# Patient Record
Sex: FEMALE | Race: WHITE | Employment: FULL TIME | URBAN - METROPOLITAN AREA
[De-identification: names, ages, dates, MRNs, and addresses within clinical notes are randomized per-mention and may not be internally consistent; named-entity substitution may affect disease eponyms.]

---

## 2018-01-11 ENCOUNTER — OFFICE VISIT (OUTPATIENT)
Dept: ORTHOPEDIC SURGERY | Age: 43
End: 2018-01-11

## 2018-01-11 VITALS
WEIGHT: 144 LBS | BODY MASS INDEX: 26.5 KG/M2 | HEIGHT: 62 IN | HEART RATE: 78 BPM | SYSTOLIC BLOOD PRESSURE: 124 MMHG | DIASTOLIC BLOOD PRESSURE: 94 MMHG

## 2018-01-11 DIAGNOSIS — M25.511 RIGHT SHOULDER PAIN, UNSPECIFIED CHRONICITY: ICD-10-CM

## 2018-01-11 DIAGNOSIS — S43.51XA SPRAIN OF ACROMIOCLAVICULAR JOINT, RIGHT, INITIAL ENCOUNTER: Primary | ICD-10-CM

## 2018-01-11 PROCEDURE — 99203 OFFICE O/P NEW LOW 30 MIN: CPT | Performed by: ORTHOPAEDIC SURGERY

## 2018-01-11 RX ORDER — ESZOPICLONE 3 MG/1
3 TABLET, FILM COATED ORAL NIGHTLY
COMMUNITY
End: 2020-07-30

## 2018-01-11 RX ORDER — FLUOXETINE 10 MG/1
10 TABLET, FILM COATED ORAL DAILY
COMMUNITY
End: 2020-07-30

## 2018-01-11 RX ORDER — SULFASALAZINE 500 MG/1
500 TABLET ORAL 4 TIMES DAILY
COMMUNITY
End: 2020-07-30

## 2018-01-11 RX ORDER — TIZANIDINE 2 MG/1
2 TABLET ORAL EVERY 6 HOURS PRN
COMMUNITY
End: 2020-07-30
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
## 2020-07-30 ENCOUNTER — OFFICE VISIT (OUTPATIENT)
Dept: ORTHOPEDIC SURGERY | Age: 45
End: 2020-07-30
Payer: COMMERCIAL

## 2020-07-30 VITALS — WEIGHT: 144 LBS | HEIGHT: 62 IN | BODY MASS INDEX: 26.5 KG/M2

## 2020-07-30 PROBLEM — Z79.899 CONTROLLED SUBSTANCE AGREEMENT SIGNED: Status: ACTIVE | Noted: 2020-06-25

## 2020-07-30 PROBLEM — R91.1 NODULE OF LEFT LUNG: Status: ACTIVE | Noted: 2018-12-28

## 2020-07-30 PROCEDURE — 99203 OFFICE O/P NEW LOW 30 MIN: CPT | Performed by: ORTHOPAEDIC SURGERY

## 2020-07-30 RX ORDER — ATORVASTATIN CALCIUM 20 MG/1
TABLET, FILM COATED ORAL
COMMUNITY
Start: 2020-06-28

## 2020-07-30 RX ORDER — FLUOXETINE 10 MG/1
CAPSULE ORAL
COMMUNITY
Start: 2020-06-25 | End: 2020-08-13

## 2020-07-30 RX ORDER — ERGOCALCIFEROL 1.25 MG/1
CAPSULE ORAL
COMMUNITY
Start: 2020-06-28

## 2020-07-30 RX ORDER — LANSOPRAZOLE 30 MG/1
CAPSULE, DELAYED RELEASE ORAL
COMMUNITY
Start: 2020-06-11 | End: 2020-07-30

## 2020-07-30 RX ORDER — DICLOFENAC SODIUM 75 MG/1
TABLET, DELAYED RELEASE ORAL
COMMUNITY
Start: 2020-05-05 | End: 2020-07-30

## 2020-07-30 RX ORDER — ESZOPICLONE 2 MG/1
TABLET, FILM COATED ORAL
COMMUNITY
Start: 2020-07-17

## 2020-07-30 ASSESSMENT — ENCOUNTER SYMPTOMS
SHORTNESS OF BREATH: 1
BACK PAIN: 1

## 2020-07-30 NOTE — PROGRESS NOTES
Review of Systems   Constitutional: Positive for unexpected weight change. Eyes:        Eye impairment   Respiratory: Positive for shortness of breath. Asthma   Musculoskeletal: Positive for back pain and neck pain. Joint pain - shoulder   Neurological: Positive for headaches. Chronic pain  fibromyalgia   Psychiatric/Behavioral:        Depression  Anxiety    All other systems reviewed and are negative.

## 2020-07-30 NOTE — PROGRESS NOTES
Diagnosis Date    Arthritis     Asthma     Blood clot in vein     PE (pulmonary thromboembolism) (HCC)     Rheumatoid arteritis (HCC)      Past Surgical History:   Procedure Laterality Date    APPENDECTOMY      CHOLECYSTECTOMY      SHOULDER SURGERY       Current Outpatient Medications on File Prior to Visit   Medication Sig Dispense Refill    eszopiclone (LUNESTA) 2 MG TABS       FLUoxetine (PROZAC) 10 MG capsule       vitamin D (ERGOCALCIFEROL) 1.25 MG (66196 UT) CAPS capsule       atorvastatin (LIPITOR) 20 MG tablet        No current facility-administered medications on file prior to visit. Social History     Socioeconomic History    Marital status:      Spouse name: Not on file    Number of children: Not on file    Years of education: Not on file    Highest education level: Not on file   Occupational History    Not on file   Social Needs    Financial resource strain: Not on file    Food insecurity     Worry: Not on file     Inability: Not on file    Transportation needs     Medical: Not on file     Non-medical: Not on file   Tobacco Use    Smoking status: Never Smoker    Smokeless tobacco: Never Used   Substance and Sexual Activity    Alcohol use:  Yes    Drug use: No    Sexual activity: Not on file   Lifestyle    Physical activity     Days per week: Not on file     Minutes per session: Not on file    Stress: Not on file   Relationships    Social connections     Talks on phone: Not on file     Gets together: Not on file     Attends Restorationism service: Not on file     Active member of club or organization: Not on file     Attends meetings of clubs or organizations: Not on file     Relationship status: Not on file    Intimate partner violence     Fear of current or ex partner: Not on file     Emotionally abused: Not on file     Physically abused: Not on file     Forced sexual activity: Not on file   Other Topics Concern    Not on file   Social History Narrative    Not on file     History reviewed. No pertinent family history. Current Medications:    Current Outpatient Medications   Medication Sig Dispense Refill    eszopiclone (LUNESTA) 2 MG TABS       FLUoxetine (PROZAC) 10 MG capsule       vitamin D (ERGOCALCIFEROL) 1.25 MG (14262 UT) CAPS capsule       atorvastatin (LIPITOR) 20 MG tablet        No current facility-administered medications for this visit. Allergies: Allergies   Allergen Reactions    Esomeprazole Magnesium Hives    Codeine Nausea And Vomiting       Physical Exam:  Vitals:     General: Yomi Marrufo is a healthy and well appearing 38 y.o. female who is sitting comfortably in our office in no acute distress. General Exam:   Constitutional: Patient is adequately groomed with no evidence of malnutrition  Neuro: alert. Oriented X 3  Pulm: Respirations unlabored and regular. Left shoulder Exam:  Inspection: No erythema, ecchymosis, lacerations/abrasions, gross deformities, or gross signs of infection. Palpation: Tenderness with palpation of the acromion and greater tuberosity. No tenderness with palpation of the clavicle or biceps tendon. Strength: 4+/5 with resistance to abduction, 5/5 with resistance to external rotation, 5/5 with resistance to internal rotation, 4/5 Champagne Toast test    Special Tests: Positive Bea's. No Cedrick muscle deformity. Neurovascular grossly intact of the left upper extremity      Comparison: Right shoulder Exam:  Inspection: No erythema, ecchymosis, lacerations/abrasions, gross deformities, or gross signs of infection. Palpation:  No tenderness to palpation of the [facility] joint, greater tuberosity, bicipital tendon    Strength: 5/5 with resistance to abduction, 5/5 with resistance to external rotation, 5/5 with resistance to internal rotation, 5/5 Champagne [place] test    Neurovascular grossly intact right upper extremity      Laboratory:  No visits with results within 14 Day(s) from this visit.    Latest known visit with results is:   No results found for any previous visit. No results found for this or any previous visit (from the past 24 hour(s)). Radiographic:  3 xray views of the left shoulder including True AP in internal and external and axillary lateral were taken in our office today reveal no dislocations, visible tumors. There is post-surgical change. Radiographic Impression: Evidence of prior TRISR [facility] joint reconstruction. Cannot rule out a nondisplaced hairline fracture of the acromion. Self assessment questionnaires including ASES and Simple Shoulder Test were completed today. Ena Ruiz is a 38 y.o. female whose symptomatology, physical exam, and radiographs are all consistent consistent with a potential nondisplaced acromial fracture and/or a rotator cuff tear. An MRI is critical to evaluate for the presence and morphology of both diagnoses. Impression:  Encounter Diagnosis   Name Primary?  Left shoulder pain, unspecified chronicity Yes       Office Procedures:  Orders Placed This Encounter   Procedures    XR SHOULDER LEFT (MIN 2 VIEWS)     Standing Status:   Future     Number of Occurrences:   1     Standing Expiration Date:   7/30/2021     Order Specific Question:   Reason for exam:     Answer:   pain    MRI SHOULDER LEFT WO CONTRAST     Standing Status:   Future     Standing Expiration Date:   7/30/2021         Plan  -MRI left shoulder to evaluate for a potential nondisplaced acromial fracture and/or rotator cuff tear  -Follow-up in clinic with results of MRI      7/30/2020  11:41 AM      Cheikh Hills MD  Fellow of Orthopaedic Surgery  [facility] and [address] [place] [address]    During this examination, Lorriane Merlin, fellow of orthopaedic surgery, functioned as a scribe for Dr. Alli Cam. This dictation was performed with a verbal recognition program (DRAGON) and it was checked for errors.   It is possible that there are still dictated errors within this office note. If so, please bring any errors to my attention for an addendum. All efforts were made to ensure that this office note is accurate.  _________  I was physically present and personally supervised the Orthopaedic Sports Medicine Fellow in the evaluation and development of a treatment plan for this patient. I personally interviewed the patient and performed a physical examination. In addition, I discussed the patient's condition and treatment options with them. I have also reviewed and agree with the past medical, family and social history unless otherwise noted. All of the patient's questions were answered. Felton Harding MD, PhD  7/30/2020

## 2020-08-13 ENCOUNTER — OFFICE VISIT (OUTPATIENT)
Dept: ORTHOPEDIC SURGERY | Age: 45
End: 2020-08-13
Payer: COMMERCIAL

## 2020-08-13 VITALS — BODY MASS INDEX: 26.5 KG/M2 | HEIGHT: 62 IN | WEIGHT: 144 LBS

## 2020-08-13 PROCEDURE — 20610 DRAIN/INJ JOINT/BURSA W/O US: CPT | Performed by: ORTHOPAEDIC SURGERY

## 2020-08-13 PROCEDURE — 99214 OFFICE O/P EST MOD 30 MIN: CPT | Performed by: ORTHOPAEDIC SURGERY

## 2020-08-13 RX ORDER — ABATACEPT 125 MG/ML
INJECTION, SOLUTION SUBCUTANEOUS
COMMUNITY
Start: 2020-07-30

## 2020-08-13 RX ORDER — FLUOXETINE HYDROCHLORIDE 20 MG/1
CAPSULE ORAL
COMMUNITY
Start: 2020-08-10

## 2020-08-13 NOTE — PROGRESS NOTES
12 [facility]  History and Physical  Shoulder Pain    Date:  8/15/2020    Name:  Kathy Mcmillan  Address:  [place] [address]. [address] [address]    :  1975      Age:   38 y.o.    SSN:  [id number]      Medical Record Number:  <[id number]>    Reason for Visit:    Shoulder Pain (F/u left shoulder MRI)      HPI:   Kathy Mcmillan is a 38 y.o. female who presents to our office today for follow up of the left shoulder pain. She has a history of undergoing a left shoulder AC joint reconstruction in  and was doing quite well until a couple months ago when she had a traction injury after her dog yanked on her leash. She since then she has been having increased pain with limited function of the shoulder. Patient does have difficulty raising her shoulder overhead and has pain past shoulder level. She denies any more injuries. Her last visit she was asked to get an MRI and was able to get this done and presents today to review the results. Of note the patient was taking a lot of oral anti-inflammatory agents which was causing GI distress and so she discontinued these recently. Pain Assessment  Location of Pain: Shoulder  Location Modifiers: Left  Severity of Pain: 6  Quality of Pain: Sharp  Duration of Pain: Persistent  Frequency of Pain: Constant  Aggravating Factors: Straightening(reaching/lifting)  Limiting Behavior: Yes  Result of Injury: No  Work-Related Injury: No  Are there other pain locations you wish to document?: No        Review of Systems:  A 14 point review of systems available in the scanned medical record as documented by the patient on 2020. The review is negative with the exception of those things mentioned in the History of Present Illness and Past Medical History. Past Medical History:  Patient's medications, allergies, past medical, surgical, social and family histories were reviewed and updated as appropriate. Allergies:   Allergies Allergen Reactions    Esomeprazole Magnesium Hives    Codeine Nausea And Vomiting       Physical Exam:  There were no vitals filed for this visit. General: Maggie Cardenas is a healthy and well appearing 38 y.o. female who is sitting comfortably in our office in acute distress. Skin:  There are no skin lesions, cellulitis, or extreme edema. The patient has warm and well-perfused Bilateral upper extremities with brisk capillary refill. Eyes: Extra-ocular muscles intact  Mouth: Oral mucosa moist. No perioral lesions  Pulm: Respirations unlabored and regular. Neuro: Alert and oriented x3    left Shoulder Exam:  Inspection:  No gross deformities, no signs of infection. Palpation: She has tenderness at the [facility] joint. She also has tenderness over the rotator cuff footprint. She has mild tenderness in the bicipital groove. No tenderness over the posterior joint line. No crepitus present. Active Range of Motion: Forward elevation of 120 with pain past 90 degrees of elevation. , abduction of 120 with pain past 90 degrees, external rotation with elbow at the side 45, internal rotation to the back is T8 versus T10 on her right    Passive Range of Motion: Passively forward elevation can be further increased to 140 has a painful arc. Strength: External rotation with resistance with elbow at the side 5/5, internal rotation with resistance with elbow at the side 5/5, supraspinatus testing +4/5    Special Tests: Pain and weakness with champagne toast testing, no Cedrick muscle deformity. Neurovascular: Sensation to light touch is intact, no motor deficits, palpable radial pulses 2+    Additional Examinations:    Examination of the contralateral extremity does not show any tenderness, deformity or injury. Range of motion is unremarkable. There is no gross instability. There are no rashes, ulcerations or lesions.  Strength and tone are normal.      Radiographic:  Left shoulder MRI 8/7/2020     FINDINGS:  No acute fracture or contusion.  Mild spurring of glenohumeral articulation.  No    acute labral tear.  No paralabral cysts.  No substantive joint effusion.         Mild tendinopathy of supraspinatus and infraspinatus, no tear.  Teres minor tendon intact.      Mild tendinopathy of subscapularis, no tear.  Biceps long head tendon intact and in normal    position.         Postsurgical change of distal clavicle.  Poorly defined coracoclavicular ligaments.  Markedly    widened AC joint space.  Acromion type 2.         No acute muscle strain. No muscle atrophy.         CONCLUSION:    1. Mild tendinopathy of supraspinatus, infraspinatus and subscapularis with no tear. 2. Mild glenohumeral arthrosis with spurring. 3. Postsurgical change of distal clavicle with widened AC joint space. Assessment:  Niraj Aguilar is a 38 y.o. female with a history of left shoulder AC joint reconstruction in 2011, who had a traction injury 2 months currently with rotator cuff tendonitis and AC joint arthritis. The [facility] joint does not appear to be affected by this. Impression:  Encounter Diagnoses   Name Primary?  Left shoulder pain, unspecified chronicity Yes    Tendinitis of left rotator cuff     Arthritis of left acromioclavicular joint        Office Procedures:  Orders Placed This Encounter   Procedures    OSR PT [facility] Physical Therapy     Referral Priority:   Routine     Referral Type:   Eval and Treat     Referral Reason:   Specialty Services Required     Requested Specialty:   Physical Therapy     Number of Visits Requested:   1    MT ARTHROCENTESIS ASPIR&/INJ MAJOR JT/BURSA W/O US     80 mg Depomedrol with 8 cc 1% Lidocaine in 10cc syringe with 25G (22G) needle       Plan:   Pertinent imaging was reviewed. The etiology, natural history, and treatment options for the disorder were discussed.   The roles of activity modifications, medications, cryotherapy and heat, injections, physical therapy, and surgical checked for errors. It is possible that there are still dictated errors within this office note. If so, please bring any errors to my attention for an addendum. All efforts were made to ensure that this office note is accurate.  ______________  I, Dr. Penny Swann, personally performed the services described in this documentation as described by Tom Lugo PA-C in my presence, and it is both accurate and complete. Felton Christianson MD, PhD  8/13/2020

## 2020-08-13 NOTE — LETTER
Physical Therapy Rehabilitation Referral    Patient Name:  Yane Fairbanks      YOB: 1975    Diagnosis:    1. Left shoulder pain, unspecified chronicity    2. Tendinitis of left rotator cuff    3. Arthritis of left acromioclavicular joint        Precautions:     [x] Evaluate and Treat    Post Op Instructions:  [] Continuous passive motion (CPM) [] Elbow ROM  [x] Exercise in plane of scapula  []  Strengthening     [] Pulley and instruction   [x] Home exercise program (copy to patient)   [] Sling when arm at risk  [] Sling or brace at all times   [] AAROM: Forward elevation to  140            [] AAROM: External rotation  To  40    [] Isometric external rotator strengthening [] AAROM: internal rotation: up the back  [x] Isometric abductor strengthening  [] AAROM: Internal abduction   [] Isometric internal rotator strengthening [] AAROM: cross-body adduction             Stretching:     Strengthening:  [x] Four quadrant (FE, ER, IR, CBA)  [x] Rotator cuff (ER, IR, Abd)  [x] Forward Elevation    [] External Rotators     [x] External Rotation    [] Internal Rotators  [x] Internal Rotation: up/back   [] Abductors     [x] Internal Rotation: supine in abduction  [x] Sleeper Stretch    [] Flexors  [x] Cross-body abduction    [] Extensors  [x] Pendulum (FE, Abd/Add, cw/ccw)  [x] Scapular Stabilizers   [x] Wall-walking (FE, Abd)        [x] Shoulder shrugs     [x] Table slides (FE)                [x] Rhomboid pinch  [] Elbow (flex, ext, pron, sup)        [] Lat.  Pull downs     [] Medial epicondylitis program       [] Forward punch   [] Lateral epicondylitis program       [] Internal rotators     [x] Progressive resistive exercises  [] Bench Press        [] Bench press plus  Activities:     [] Lateral pull-downs  [] Rowing     [] Progressive two-hand supine press  [] Stepper/Exercise bike   [x] Biceps: curls/supination  [] Swimming  [] Water exercises    Modalities:     Return to Sport:

## 2024-07-08 SDOH — HEALTH STABILITY: PHYSICAL HEALTH: ON AVERAGE, HOW MANY MINUTES DO YOU ENGAGE IN EXERCISE AT THIS LEVEL?: 30 MIN

## 2024-07-08 SDOH — HEALTH STABILITY: PHYSICAL HEALTH: ON AVERAGE, HOW MANY DAYS PER WEEK DO YOU ENGAGE IN MODERATE TO STRENUOUS EXERCISE (LIKE A BRISK WALK)?: 2 DAYS

## 2024-07-11 ENCOUNTER — OFFICE VISIT (OUTPATIENT)
Dept: ORTHOPEDIC SURGERY | Age: 49
End: 2024-07-11
Payer: COMMERCIAL

## 2024-07-11 VITALS — WEIGHT: 144 LBS | HEIGHT: 62 IN | BODY MASS INDEX: 26.5 KG/M2

## 2024-07-11 DIAGNOSIS — M75.21 BICEPS TENDINITIS OF RIGHT SHOULDER: ICD-10-CM

## 2024-07-11 DIAGNOSIS — M75.81 RIGHT ROTATOR CUFF TENDINITIS: ICD-10-CM

## 2024-07-11 DIAGNOSIS — M25.511 RIGHT SHOULDER PAIN, UNSPECIFIED CHRONICITY: Primary | ICD-10-CM

## 2024-07-11 PROCEDURE — 99203 OFFICE O/P NEW LOW 30 MIN: CPT | Performed by: ORTHOPAEDIC SURGERY

## 2024-07-11 RX ORDER — NABUMETONE 500 MG/1
500 TABLET, FILM COATED ORAL 2 TIMES DAILY PRN
COMMUNITY
Start: 2024-03-13

## 2024-07-11 RX ORDER — TIZANIDINE 2 MG/1
TABLET ORAL
COMMUNITY
Start: 2024-05-02

## 2024-07-11 RX ORDER — ONDANSETRON 4 MG/1
TABLET, ORALLY DISINTEGRATING ORAL
COMMUNITY
Start: 2024-05-02

## 2024-07-11 RX ORDER — PLECANATIDE 3 MG/1
3 TABLET ORAL DAILY
COMMUNITY
Start: 2023-06-06

## 2024-07-11 RX ORDER — PANTOPRAZOLE SODIUM 40 MG/1
40 TABLET, DELAYED RELEASE ORAL DAILY
COMMUNITY
Start: 2024-05-01

## 2024-07-11 RX ORDER — CERTOLIZUMAB PEGOL 200 MG/ML
INJECTION, SOLUTION SUBCUTANEOUS
COMMUNITY
Start: 2024-07-07

## 2024-07-11 RX ORDER — SEMAGLUTIDE 1.34 MG/ML
INJECTION, SOLUTION SUBCUTANEOUS
COMMUNITY
Start: 2024-01-15

## 2024-07-11 RX ORDER — VALACYCLOVIR HYDROCHLORIDE 500 MG/1
TABLET, FILM COATED ORAL
COMMUNITY
Start: 2024-06-05
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
## 2024-07-12 ENCOUNTER — TELEPHONE (OUTPATIENT)
Dept: ORTHOPEDIC SURGERY | Age: 49
End: 2024-07-12

## 2024-07-25 ENCOUNTER — OFFICE VISIT (OUTPATIENT)
Dept: ORTHOPEDIC SURGERY | Age: 49
End: 2024-07-25

## 2024-07-25 VITALS — BODY MASS INDEX: 26.5 KG/M2 | WEIGHT: 144 LBS | HEIGHT: 62 IN

## 2024-07-25 DIAGNOSIS — M75.21 BICEPS TENDINITIS OF RIGHT SHOULDER: ICD-10-CM

## 2024-07-25 DIAGNOSIS — M25.511 RIGHT SHOULDER PAIN, UNSPECIFIED CHRONICITY: Primary | ICD-10-CM

## 2024-07-25 DIAGNOSIS — M75.81 RIGHT ROTATOR CUFF TENDINITIS: ICD-10-CM

## 2024-07-25 RX ORDER — METHYLPREDNISOLONE ACETATE 40 MG/ML
80 INJECTION, SUSPENSION INTRA-ARTICULAR; INTRALESIONAL; INTRAMUSCULAR; SOFT TISSUE ONCE
Status: COMPLETED | OUTPATIENT
Start: 2024-07-25 | End: 2024-07-25

## 2024-07-25 RX ORDER — LIDOCAINE HYDROCHLORIDE 10 MG/ML
8 INJECTION, SOLUTION INFILTRATION; PERINEURAL ONCE
Status: COMPLETED | OUTPATIENT
Start: 2024-07-25 | End: 2024-07-25

## 2024-07-25 RX ADMIN — METHYLPREDNISOLONE ACETATE 80 MG: 40 INJECTION, SUSPENSION INTRA-ARTICULAR; INTRALESIONAL; INTRAMUSCULAR; SOFT TISSUE at 12:45

## 2024-07-25 RX ADMIN — LIDOCAINE HYDROCHLORIDE 8 ML: 10 INJECTION, SOLUTION INFILTRATION; PERINEURAL at 12:44
